# Patient Record
Sex: MALE | Race: OTHER | URBAN - METROPOLITAN AREA
[De-identification: names, ages, dates, MRNs, and addresses within clinical notes are randomized per-mention and may not be internally consistent; named-entity substitution may affect disease eponyms.]

---

## 2021-08-25 ENCOUNTER — OFFICE VISIT (OUTPATIENT)
Dept: PRIMARY CARE CLINIC | Age: 21
End: 2021-08-25
Payer: COMMERCIAL

## 2021-08-25 VITALS
SYSTOLIC BLOOD PRESSURE: 126 MMHG | BODY MASS INDEX: 22.67 KG/M2 | WEIGHT: 149.6 LBS | DIASTOLIC BLOOD PRESSURE: 69 MMHG | TEMPERATURE: 98.1 F | OXYGEN SATURATION: 99 % | HEART RATE: 56 BPM | HEIGHT: 68 IN

## 2021-08-25 DIAGNOSIS — J30.2 SEASONAL ALLERGIC RHINITIS, UNSPECIFIED TRIGGER: Primary | ICD-10-CM

## 2021-08-25 DIAGNOSIS — J45.990 EXERCISE-INDUCED ASTHMA: ICD-10-CM

## 2021-08-25 DIAGNOSIS — L70.0 ACNE VULGARIS: ICD-10-CM

## 2021-08-25 PROCEDURE — 99203 OFFICE O/P NEW LOW 30 MIN: CPT | Performed by: FAMILY MEDICINE

## 2021-08-25 RX ORDER — IVERMECTIN 10 MG/G
CREAM TOPICAL DAILY
COMMUNITY
Start: 2020-10-01

## 2021-08-25 RX ORDER — ADAPALENE 0.1 G/100G
CREAM TOPICAL NIGHTLY
COMMUNITY

## 2021-08-25 RX ORDER — ALBUTEROL SULFATE 90 UG/1
2 AEROSOL, METERED RESPIRATORY (INHALATION) 4 TIMES DAILY PRN
Qty: 1 INHALER | Refills: 3 | Status: SHIPPED | OUTPATIENT
Start: 2021-08-25 | End: 2022-07-14

## 2021-08-25 RX ORDER — FLUTICASONE PROPIONATE 50 MCG
1 SPRAY, SUSPENSION (ML) NASAL DAILY
Qty: 1 BOTTLE | Refills: 0 | COMMUNITY
Start: 2021-08-25

## 2021-08-25 ASSESSMENT — PATIENT HEALTH QUESTIONNAIRE - PHQ9
SUM OF ALL RESPONSES TO PHQ9 QUESTIONS 1 & 2: 0
2. FEELING DOWN, DEPRESSED OR HOPELESS: 0
SUM OF ALL RESPONSES TO PHQ QUESTIONS 1-9: 0
1. LITTLE INTEREST OR PLEASURE IN DOING THINGS: 0
SUM OF ALL RESPONSES TO PHQ QUESTIONS 1-9: 0
SUM OF ALL RESPONSES TO PHQ QUESTIONS 1-9: 0

## 2021-08-25 ASSESSMENT — ENCOUNTER SYMPTOMS
SHORTNESS OF BREATH: 0
ABDOMINAL PAIN: 0
NAUSEA: 0
SORE THROAT: 0

## 2021-08-25 NOTE — PROGRESS NOTES
Palpations: Abdomen is soft. There is no mass. Tenderness: There is no abdominal tenderness. Musculoskeletal:         General: No swelling or deformity. Normal range of motion. Cervical back: Neck supple. Lymphadenopathy:      Cervical: No cervical adenopathy. Skin:     General: Skin is warm and dry. Findings: No rash. Neurological:      General: No focal deficit present. Mental Status: He is alert and oriented to person, place, and time. Cranial Nerves: No cranial nerve deficit. Psychiatric:         Mood and Affect: Mood normal.         Behavior: Behavior is cooperative. Assessment:  Encounter Diagnoses   Name Primary?  Seasonal allergic rhinitis, unspecified trigger Yes    Exercise-induced asthma - pool chemical reaction     Acne vulgaris        Plan:  1. Seasonal allergic rhinitis, unspecified trigger  Symptoms c/w allergies - environmental VS seasonal. Extensive education provided today regarding trigger avoidance and safe management with OTC medications. Provided answers to all questions today. Given precautions regarding new/progressive symptoms that are not responsive to medications that were discussed today. Warning signs of infection to ears, nose and sinuses given. - fluticasone (FLONASE) 50 MCG/ACT nasal spray; 1 spray by Each Nostril route daily  Dispense: 1 Bottle; Refill: 0    2. Exercise-induced asthma - pool chemical reaction  Acute on chronic symptoms suggestive of mild reactive airway disease likely triggered by chemicals in the swimming pool that he has been trending up. At this time he should monitor closely, but I have provided him an inhaler to use as pretreatment before starting his swims. If he develops symptoms outside of the pool exposure we could consider something like Singulair daily. Given precautions and told to follow-up as needed. - albuterol sulfate  (90 Base) MCG/ACT inhaler;  Inhale 2 puffs into the lungs 4 times daily as needed for Wheezing or Shortness of Breath  Dispense: 1 Inhaler; Refill: 3    3. Acne vulgaris  Chronic, continue with plan to see dermatologist next week. Follow-up per their recommendations. Return in about 31 weeks (around 3/30/2022) for Annual Physical.               Giovani De La Torre, DO     Please note that this chart was generated using dragon dictation software. Although every effort was made to ensure the accuracy of this automated transcription, some errors in transcription may have occurred.

## 2021-08-31 ASSESSMENT — ENCOUNTER SYMPTOMS
WHEEZING: 0
COUGH: 1
CHEST TIGHTNESS: 1

## 2022-07-14 ENCOUNTER — OFFICE VISIT (OUTPATIENT)
Dept: PRIMARY CARE CLINIC | Age: 22
End: 2022-07-14

## 2022-07-14 VITALS
BODY MASS INDEX: 21.22 KG/M2 | SYSTOLIC BLOOD PRESSURE: 118 MMHG | OXYGEN SATURATION: 99 % | HEIGHT: 68 IN | TEMPERATURE: 98 F | HEART RATE: 72 BPM | DIASTOLIC BLOOD PRESSURE: 72 MMHG | WEIGHT: 140 LBS

## 2022-07-14 DIAGNOSIS — U09.9 POST-COVID SYNDROME: Primary | ICD-10-CM

## 2022-07-14 DIAGNOSIS — R49.8 WEAKNESS OF VOICE: ICD-10-CM

## 2022-07-14 PROCEDURE — 99213 OFFICE O/P EST LOW 20 MIN: CPT | Performed by: FAMILY MEDICINE

## 2022-07-14 RX ORDER — PREDNISONE 20 MG/1
TABLET ORAL
Qty: 11 TABLET | Refills: 0 | Status: SHIPPED | OUTPATIENT
Start: 2022-07-14

## 2022-07-14 SDOH — ECONOMIC STABILITY: FOOD INSECURITY: WITHIN THE PAST 12 MONTHS, THE FOOD YOU BOUGHT JUST DIDN'T LAST AND YOU DIDN'T HAVE MONEY TO GET MORE.: NEVER TRUE

## 2022-07-14 SDOH — ECONOMIC STABILITY: FOOD INSECURITY: WITHIN THE PAST 12 MONTHS, YOU WORRIED THAT YOUR FOOD WOULD RUN OUT BEFORE YOU GOT MONEY TO BUY MORE.: NEVER TRUE

## 2022-07-14 ASSESSMENT — PATIENT HEALTH QUESTIONNAIRE - PHQ9
1. LITTLE INTEREST OR PLEASURE IN DOING THINGS: 0
SUM OF ALL RESPONSES TO PHQ QUESTIONS 1-9: 0
SUM OF ALL RESPONSES TO PHQ9 QUESTIONS 1 & 2: 0
SUM OF ALL RESPONSES TO PHQ QUESTIONS 1-9: 0
2. FEELING DOWN, DEPRESSED OR HOPELESS: 0

## 2022-07-14 ASSESSMENT — SOCIAL DETERMINANTS OF HEALTH (SDOH): HOW HARD IS IT FOR YOU TO PAY FOR THE VERY BASICS LIKE FOOD, HOUSING, MEDICAL CARE, AND HEATING?: NOT HARD AT ALL

## 2022-07-14 NOTE — PROGRESS NOTES
[address] [facility] [facility]  [address] [place] [id number]  Dept: [phone]  Dept Fax: [phone]     7/14/2022      Kulwinder Rice   2000     Chief Complaint   Patient presents with    Cough       HPI  Pt comes in today for f/u on recent COVID illness 3 weeks ago. Testing negative, but having a lot of cough still. Seems to be worse? Some weakness of voice. Mild SOB with talking. Taking Robitussin without improvements. Denies obvious allergies with post nasal gtt. Denies obvious GERD symptoms. Has been having difficulty with this as it makes it hard for him to work, has to talk quite a bit for this. PHQ Scores 7/14/2022 8/25/2021   PHQ2 Score 0 0   PHQ9 Score 0 0     Interpretation of Total Score Depression Severity: 1-4 = Minimal depression, 5-9 = Mild depression, 10-14 = Moderate depression, 15-19 = Moderately severe depression, 20-27 = Severe depression     Prior to Visit Medications    Medication Sig Taking? Authorizing Provider   Ivermectin 1 % CREA Apply topically daily Yes Historical Provider, MD   Cetirizine HCl (ZYRTEC PO) Take 1 tablet by mouth daily Yes Historical Provider, MD   adapalene (DIFFERIN) 0.1 % cream Apply topically nightly Apply topically nightly. Yes Historical Provider, MD   BENZOYL PEROXIDE ER EX Apply topically daily Yes Historical Provider, MD   fluticasone (FLONASE) 50 MCG/ACT nasal spray 1 spray by Each Nostril route daily Yes Ced Cedeno DO       Past Medical History:   Diagnosis Date    Acne vulgaris 8/25/2021    Allergic rhinitis         Social History     Tobacco Use    Smoking status: Never    Smokeless tobacco: Never   Substance Use Topics    Alcohol use: Yes     Comment: once a week    Drug use: Never        History reviewed. No pertinent surgical history.      Allergies   Allergen Reactions    Penicillins Rash        Family History   Problem Relation Age of Onset    High Cholesterol Mother     High Cholesterol Father     No Known Problems Brother     Heart Attack Maternal Grandfather     Cancer Paternal Grandmother         Liver        Patient's past medical history, surgical history, family history, medications, and allergies  were all reviewed and updated as appropriate today. Review of systems per HPI above, otherwise negative. /72   Pulse 72   Temp 98 °F (36.7 °C)   Ht 5' 8\" (1.727 m)   Wt 140 lb (63.5 kg)   SpO2 99%   BMI 21.29 kg/m²      Physical Exam  Vitals reviewed. Constitutional:       General: He is not in acute distress. Appearance: Normal appearance. He is well-developed and normal weight. HENT:      Head: Normocephalic and atraumatic. Right Ear: Tympanic membrane and ear canal normal. No drainage. No middle ear effusion. Tympanic membrane is not erythematous. Left Ear: Tympanic membrane and ear canal normal. No drainage. No middle ear effusion. Tympanic membrane is not erythematous. Nose: Nose normal. No rhinorrhea. Mouth/Throat:      Mouth: Mucous membranes are moist.      Pharynx: No oropharyngeal exudate or posterior oropharyngeal erythema. Eyes:      Extraocular Movements: Extraocular movements intact. Pupils: Pupils are equal, round, and reactive to light. Neck:      Thyroid: No thyromegaly. Cardiovascular:      Rate and Rhythm: Normal rate and regular rhythm. Heart sounds: No murmur heard. Pulmonary:      Effort: Pulmonary effort is normal.      Breath sounds: Normal breath sounds. No wheezing. Abdominal:      General: Bowel sounds are normal.      Palpations: Abdomen is soft. There is no mass. Tenderness: There is no abdominal tenderness. Musculoskeletal:         General: No swelling. Normal range of motion. Cervical back: Neck supple. Lymphadenopathy:      Cervical: No cervical adenopathy. Skin:     General: Skin is warm and dry. Findings: No rash. Neurological:      General: No focal deficit present. Mental Status: He is alert and oriented to person, place, and time. Cranial Nerves: No cranial nerve deficit. Psychiatric:         Mood and Affect: Mood normal.       Assessment:  Encounter Diagnoses   Name Primary? Post-COVID syndrome - cough Yes    Weakness of voice        Plan:  1. Post-COVID syndrome - cough  Acute, suspect this to just be postviral.  Discussed with patient that it is very common to have a number of post viral symptoms such as cough with something like COVID. I do believe that with more time the symptoms will resolve. At this time I have offered an oral prednisone burst to go ahead and use to see if this will help expedite this process. Patient will go ahead and send me a uShare message update in the next 2 weeks  - predniSONE (DELTASONE) 20 MG tablet; Take 2 tablets per day for 4 days, then 1 tablet per day for 3 days  Dispense: 11 tablet; Refill: 0    2. Weakness of voice  If symptoms do not resolve we will refer him to ENT for direct laryngoscopy  - predniSONE (DELTASONE) 20 MG tablet; Take 2 tablets per day for 4 days, then 1 tablet per day for 3 days  Dispense: 11 tablet; Refill: 0    Return if symptoms worsen or fail to improve. Trey Sheth, DO     Please note that this chart was generated using dragon dictation software. Although every effort was made to ensure the accuracy of this automated transcription, some errors in transcription may have occurred.